# Patient Record
Sex: MALE | Race: OTHER | HISPANIC OR LATINO | URBAN - METROPOLITAN AREA
[De-identification: names, ages, dates, MRNs, and addresses within clinical notes are randomized per-mention and may not be internally consistent; named-entity substitution may affect disease eponyms.]

---

## 2024-03-15 ENCOUNTER — OV NP (OUTPATIENT)
Dept: URBAN - METROPOLITAN AREA CLINIC 82 | Facility: CLINIC | Age: 29
End: 2024-03-15
Payer: COMMERCIAL

## 2024-03-15 VITALS
HEIGHT: 69 IN | WEIGHT: 163 LBS | HEART RATE: 67 BPM | SYSTOLIC BLOOD PRESSURE: 132 MMHG | TEMPERATURE: 97.9 F | BODY MASS INDEX: 24.14 KG/M2 | DIASTOLIC BLOOD PRESSURE: 80 MMHG

## 2024-03-15 DIAGNOSIS — R19.7 DIARRHEA, UNSPECIFIED TYPE: ICD-10-CM

## 2024-03-15 DIAGNOSIS — R10.13 EPIGASTRIC PAIN: ICD-10-CM

## 2024-03-15 DIAGNOSIS — K62.5 RECTAL BLEEDING: ICD-10-CM

## 2024-03-15 DIAGNOSIS — A04.8 H. PYLORI INFECTION: ICD-10-CM

## 2024-03-15 PROCEDURE — 99204 OFFICE O/P NEW MOD 45 MIN: CPT | Performed by: STUDENT IN AN ORGANIZED HEALTH CARE EDUCATION/TRAINING PROGRAM

## 2024-03-15 NOTE — HPI-TODAY'S VISIT:
This is a 28-year-old man no significant past medical history presenting today to discuss multiple GI issues. The patient reports that he has had a history of GERD for many many years.  He reports that he has burning in his throat and in his chest.  He also reports that he has chronic epigastric pain at the mouth of the stomach that feels like a burning type sensation. He reports that he recently saw his primary care physician to perform a H. pylori test that was positive.  Patient reports that he is currently 1 week into his 14-day treatment.  He reports that he was sent for an upper endoscopy by his primary care physician for further evaluation. Patient reports that he has multiple GI issues in the lower GI tract as well.  Patient reports that he has chronic diarrhea.  He reports that his stool is very unformed.  He reports that he has these diarrheal episodes multiple times a month and that sometimes he sees blood.  He reports that usually the blood is on the toilet paper however he is seen in the bowl as well. He reports that at times his stool is very dark black.  Patient reports he has no family history of colon cancer but he does have a grandmother with liver cancer.

## 2024-04-26 ENCOUNTER — COL/EGD (OUTPATIENT)
Dept: URBAN - METROPOLITAN AREA SURGERY CENTER 13 | Facility: SURGERY CENTER | Age: 29
End: 2024-04-26

## 2024-05-03 ENCOUNTER — OFFICE VISIT (OUTPATIENT)
Dept: URBAN - METROPOLITAN AREA CLINIC 82 | Facility: CLINIC | Age: 29
End: 2024-05-03

## 2024-05-06 ENCOUNTER — DASHBOARD ENCOUNTERS (OUTPATIENT)
Age: 29
End: 2024-05-06

## 2024-05-06 ENCOUNTER — OFFICE VISIT (OUTPATIENT)
Dept: URBAN - METROPOLITAN AREA CLINIC 82 | Facility: CLINIC | Age: 29
End: 2024-05-06
Payer: COMMERCIAL

## 2024-05-06 VITALS
TEMPERATURE: 98.4 F | HEART RATE: 62 BPM | HEIGHT: 69 IN | BODY MASS INDEX: 25.03 KG/M2 | DIASTOLIC BLOOD PRESSURE: 79 MMHG | WEIGHT: 169 LBS | SYSTOLIC BLOOD PRESSURE: 134 MMHG

## 2024-05-06 DIAGNOSIS — A04.8 OTHER SPECIFIED BACTERIAL INTESTINAL INFECTIONS: ICD-10-CM

## 2024-05-06 DIAGNOSIS — K21.9 CHRONIC GERD: ICD-10-CM

## 2024-05-06 PROBLEM — 235595009: Status: ACTIVE | Noted: 2024-05-06

## 2024-05-06 PROCEDURE — 99214 OFFICE O/P EST MOD 30 MIN: CPT | Performed by: STUDENT IN AN ORGANIZED HEALTH CARE EDUCATION/TRAINING PROGRAM

## 2024-05-06 RX ORDER — TETRACYCLINE HYDROCHLORIDE 500 MG/1
1 CAPSULE ON AN EMPTY STOMACH CAPSULE ORAL
Qty: 56 CAPSULE | Status: ACTIVE | COMMUNITY
Start: 2024-05-05 | End: 2024-05-19

## 2024-05-06 RX ORDER — OMEPRAZOLE 40 MG/1
1 CAPSULE 30 MINUTES BEFORE MORNING AND EVENING MEAL CAPSULE, DELAYED RELEASE ORAL TWICE A DAY
Qty: 60 | Refills: 3 | Status: ACTIVE | COMMUNITY
Start: 2024-05-05

## 2024-05-06 RX ORDER — BISMUTH SUBSALICYLATE 262 MG/1
2 TABLETS WITH MEALS AND AT BEDTIME TABLET, CHEWABLE ORAL
Qty: 112 TABLET | Refills: 0 | Status: ACTIVE | COMMUNITY
Start: 2024-05-05 | End: 2024-05-19

## 2024-05-06 RX ORDER — METRONIDAZOLE 500 MG/1
1 TABLET TABLET ORAL THREE TIMES A DAY
Qty: 42 TABLET | Refills: 0 | Status: ACTIVE | COMMUNITY
Start: 2024-05-05 | End: 2024-05-19

## 2024-06-21 ENCOUNTER — OFFICE VISIT (OUTPATIENT)
Dept: URBAN - METROPOLITAN AREA CLINIC 82 | Facility: CLINIC | Age: 29
End: 2024-06-21